# Patient Record
Sex: MALE | Race: WHITE | NOT HISPANIC OR LATINO | URBAN - METROPOLITAN AREA
[De-identification: names, ages, dates, MRNs, and addresses within clinical notes are randomized per-mention and may not be internally consistent; named-entity substitution may affect disease eponyms.]

---

## 2017-02-01 ENCOUNTER — OUTPATIENT (OUTPATIENT)
Dept: OUTPATIENT SERVICES | Facility: HOSPITAL | Age: 57
LOS: 1 days | End: 2017-02-01
Payer: MEDICAID

## 2017-03-10 DIAGNOSIS — R69 ILLNESS, UNSPECIFIED: ICD-10-CM

## 2017-09-01 PROCEDURE — G9001: CPT

## 2018-03-01 ENCOUNTER — OUTPATIENT (OUTPATIENT)
Dept: OUTPATIENT SERVICES | Facility: HOSPITAL | Age: 58
LOS: 1 days | End: 2018-03-01

## 2018-03-23 DIAGNOSIS — R69 ILLNESS, UNSPECIFIED: ICD-10-CM

## 2019-04-01 ENCOUNTER — OUTPATIENT (OUTPATIENT)
Dept: OUTPATIENT SERVICES | Facility: HOSPITAL | Age: 59
LOS: 1 days | End: 2019-04-01
Payer: MEDICAID

## 2019-04-01 DIAGNOSIS — Z71.89 OTHER SPECIFIED COUNSELING: ICD-10-CM

## 2019-04-01 PROCEDURE — G9001: CPT

## 2023-03-30 ENCOUNTER — APPOINTMENT (OUTPATIENT)
Dept: NEUROSURGERY | Facility: CLINIC | Age: 63
End: 2023-03-30
Payer: OTHER GOVERNMENT

## 2023-03-30 VITALS
WEIGHT: 192 LBS | OXYGEN SATURATION: 95 % | HEART RATE: 80 BPM | DIASTOLIC BLOOD PRESSURE: 60 MMHG | BODY MASS INDEX: 25.45 KG/M2 | TEMPERATURE: 98 F | HEIGHT: 73 IN | SYSTOLIC BLOOD PRESSURE: 90 MMHG

## 2023-03-30 PROCEDURE — 99072 ADDL SUPL MATRL&STAF TM PHE: CPT

## 2023-03-30 PROCEDURE — 99203 OFFICE O/P NEW LOW 30 MIN: CPT

## 2023-03-31 NOTE — ASSESSMENT
[FreeTextEntry1] : Mr. Crowder presents with above history and imaging.  Patient is neurologically intact on exam.  I have personally reviewed an x-ray of the coccyx which reveals there is mild comfort with this and angulation of the coccyx in addition I have reviewed an MRI of the pelvis which shows that there is evidence of a small coccyx fracture.  I would like to obtain the MRI of the lumbar spine as well for review.  Patient denies any acute trauma.  And he has a strong history of lumbar degenerative changes.  In the interim [facility].  Patient is happy with this plan.\par Patient has been given an opportunity to ask and have their questions answered to their satisfaction.\par Patient knows to call the office if there are any new or worsening symptoms.\par \par \par I, Dr. Charly Berger, personally performed the evaluation and management (E/M) services for this patient.  That E/M includes assessment of the initial examination, assessing the pertinent medical and surgical history, and establishing the plan of care.  At the time of the visit, my ACP, Hawa Garcia, actively participated in the evaluation and management services for this patient to be followed going forward in accordance with the plan documented in the clinical note.\par \par \par

## 2023-03-31 NOTE — REASON FOR VISIT
[New Patient Visit] : a new patient visit [Referred By: _________] : Patient was referred by RAMÓN [FreeTextEntry1] : coccyx pain

## 2023-04-20 ENCOUNTER — APPOINTMENT (OUTPATIENT)
Dept: NEUROSURGERY | Facility: CLINIC | Age: 63
End: 2023-04-20
Payer: OTHER GOVERNMENT

## 2023-04-20 VITALS
OXYGEN SATURATION: 98 % | WEIGHT: 192 LBS | HEIGHT: 73 IN | BODY MASS INDEX: 25.45 KG/M2 | SYSTOLIC BLOOD PRESSURE: 118 MMHG | HEART RATE: 86 BPM | TEMPERATURE: 97.7 F | DIASTOLIC BLOOD PRESSURE: 78 MMHG

## 2023-04-20 DIAGNOSIS — M53.3 SACROCOCCYGEAL DISORDERS, NOT ELSEWHERE CLASSIFIED: ICD-10-CM

## 2023-04-20 PROCEDURE — 99072 ADDL SUPL MATRL&STAF TM PHE: CPT

## 2023-04-20 PROCEDURE — 99214 OFFICE O/P EST MOD 30 MIN: CPT

## 2023-04-30 PROBLEM — M53.3 COCCYALGIA: Status: ACTIVE | Noted: 2023-03-30

## 2023-04-30 NOTE — HISTORY OF PRESENT ILLNESS
[> 3 months] : more  than 3 months [FreeTextEntry1] : Coccyx pain [de-identified] : MALIK COHN is a 63 year old male presents for initial neurosurgical evaluation of coccyx pain.  Past medical history includes hypertension and peripheral neuropathy.\par [facility].  He describes a longstanding history of coccyx pain which stems originally from a pilonidal cyst removal dating back to 2004.  Patient states he was [facility] where he describes that he has coccyx pain he feels like there is movement in that region.  He feels like there is a sensation of him collecting fluid in that region similar in presentation to when he had his pilonidal cyst.  He states that the pain comes and goes and he feels cascades sharp pain that come and go.  He does not endorse any recent trauma or injury.  He does have bilateral numbness tingling of the legs for which she has had for some time most particularly in the bilateral feet.  They are described as constantly numb tingly.  In when asked about pain intensity he says that its not pain that he feels it is a cascade sharpness.  He presents today with a MRI of the pelvis that reveals evidence of a coccyx fracture.  He states he has long-term history of cervical and lumbar degenerative disease.  He states he had a MRI not too long ago of the lumbar spine that confirmed that he had some stenosis but did not presented today for review.\par \par

## 2023-04-30 NOTE — REASON FOR VISIT
[Follow-Up: _____] : a [unfilled] follow-up visit [New Patient Visit] : a new patient visit [Referred By: _________] : Patient was referred by RAMÓN [FreeTextEntry1] : coccyx pain

## 2023-04-30 NOTE — ASSESSMENT
[FreeTextEntry1] : Mr. Crowder presents with above history and imaging.  Patient is neurologically intact on exam.  I have personally reviewed an x-ray of the coccyx which reveals there is mild comfort with this and angulation of the coccyx in addition I have reviewed an MRI of the pelvis which shows that there is evidence of a small coccyx fracture. I have personally reviewed his CT lumbar spine stable coccyx fracte=ure.\par We discussed there is no surgical intervention for this diagnosis.\par If he is able to retrieve his MRI lumbar spine for review, I would happily do so. \par And he has a strong history of lumbar degenerative changes.  In the interim he can [facility].  Patient is happy with this plan.\par Patient has been given an opportunity to ask and have their questions answered to their satisfaction.\par Patient knows to call the office if there are any new or worsening symptoms.\par \par \par I, Dr. Charly Berger, personally performed the evaluation and management (E/M) services for this patient.  That E/M includes assessment of the initial examination, assessing the pertinent medical and surgical history, and establishing the plan of care.  At the time of the visit, my ACP, Hawa Gacria, actively participated in the evaluation and management services for this patient to be followed going forward in accordance with the plan documented in the clinical note.\par \par \par

## 2023-04-30 NOTE — DATA REVIEWED
[de-identified] : DATE OF EXAM: 04/11/2023 T[facility]O Phys. Name: Hawa Garcia R. Phys. Address: [address] [place] [address], [facility] R. Phys. Phone: [phone] CT-LUMBAR SPINE NON CONTRAST  History: Coccydynia, clinical concern for coccyx fracture.  Technique: Axial images were obtained through the lumbar spine with sagittal, axial and coronal reformatted images then generated from the axial acquired data. This study was performed using automatic exposure control (radiation dose reduction software) to obtain a diagnostic image quality scan with patient dose as low as reasonably achievable. One or more of the following dose reduction techniques were used: Automated exposure control, Adjustment of the mA and/or kV according to patient size or Use of iterative reconstruction technique. The administered radiation dose was 23.655 mSv.  Comparison examinations: MRI dated September 11, 2017.  Sagittal alignment: Grade I degenerative spondylolisthesis at L3-4.  Disc spaces: Mild degenerative disc disease at L3-4 through L5-S1.  L3-4: Grade I degenerative spondylolisthesis and bilateral facet osteoarthrosis C3 nerve root compression.  L4-5: Small bulge and bilateral facet osteoarthrosis but no stenosis or nerve root compression.  L5-S1: Small central disc herniation and bilateral facet osteoarthrosis but no stenosis or nerve root compression.  Bone density: Normal.  Vertebral bodies: No fracture.  Pelvis: Mild degenerative osteoarthrosis of the right sacroiliac joint. The left sacroiliac joint, sacrum and iliac wings are normal.  Coccyx: There is minimal malalignment of the first and second coccygeal segments with bone proliferation both anterior and posteriorly suggestive of a remote fracture.  Paraspinal soft tissues: Minimal atherosclerotic vascular calcifications in the aorta and iliac arteries.  IMPRESSION:   Minimal malalignment of the first and second coccygeal segments with bone proliferation both anteriorly and posteriorly suggestive of a remote fracture.  Multilevel lumbar degenerative disc and facet disease with Grade I degenerative spondylolisthesis at L3-4.  Small bulge at L4-5 and a small disc herniation at L5-S1 without stenosis or nerve root compression.  Mild degenerative osteoarthrosis of the right sacroiliac joint.  Minimal atherosclerotic vascular calcifications in the aorta and iliac arteries.  Signed by: Jj Bueno MD Signed Date: 4/15/2023 7:20 AM EDT    SIGNED BY: Jj Bueno M.D., Ext. [phone] 04/15/2023 07:20 AM  IMPRESSION:   Minimal malalignment of the first and second coccygeal segments with bone proliferation both anteriorly and posteriorly suggestive of a remote fracture.  Multilevel lumbar degenerative disc and facet disease with Grade I degenerative spondylolisthesis at L3-4.

## 2023-06-20 NOTE — HISTORY OF PRESENT ILLNESS
Back from CT scan, Patient reassessed. Reports feeling short of breath. Patient reports \"maybe I was just anxious from the CT scan\". RN assessing, lungs clear bilaterally. Noted being dyspneic at rest and denies hx of this. [place]   [> 3 months] : more  than 3 months [FreeTextEntry1] : Coccyx pain [de-identified] : MALIK COHN is a 63 year old male presents for initial neurosurgical evaluation of coccyx pain.  Past medical history includes hypertension and peripheral neuropathy.\par [facility].  He describes a longstanding history of coccyx pain which stems originally from a pilonidal cyst removal dating back to 2004.  Patient states he [facility] where he describes that he has coccyx pain he feels like there is movement in that region.  He feels like there is a sensation of him collecting fluid in that region similar in presentation to when he had his pilonidal cyst.  He states that the pain comes and goes and he feels cascades sharp pain that come and go.  He does not endorse any recent trauma or injury.  He does have bilateral numbness tingling of the legs for which she has had for some time most particularly in the bilateral feet.  They are described as constantly numb tingly.  In when asked about pain intensity he says that its not pain that he feels it is a cascade sharpness.  He presents today with a MRI of the pelvis that reveals evidence of a coccyx fracture.  He states he has long-term history of cervical and lumbar degenerative disease.  He states he had a MRI not too long ago of the lumbar spine that confirmed that he had some stenosis but did not presented today for review.\par \par